# Patient Record
Sex: FEMALE | Race: AMERICAN INDIAN OR ALASKA NATIVE
[De-identification: names, ages, dates, MRNs, and addresses within clinical notes are randomized per-mention and may not be internally consistent; named-entity substitution may affect disease eponyms.]

---

## 2017-06-01 NOTE — ULTRASOUND REPORT
BILATERAL DIGITAL DIAGNOSTIC MAMMOGRAM and BILATERAL BREAST ULTRASOUND: 

06/01/17 15:13:00



CLINICAL: Recalled for bilateral asymmetries.



COMPARISON:05/16/17 screening



FINDINGS: Bilateral spot compression views demonstrate persistent 

bilateral partially circumscribed asymmetries.



Ultrasound of the right breast (including all four quadrants and the 

retroareolar area) was performed and demonstrated no solid mass or 

shadowing.  Numerous benign cysts.  The largest is at 10:30 o'clock 3 

cm from the nipple measures 1.5 x 0.9 x 1.7 cm and correlates with the 

mammographic asymmetry.  A cyst at 1 o'clock 3 cm from the nipple 

measures 4 x 8 mm.



Ultrasound of the left breast (including all four quadrants and the 

retroareolar area) was performed and demonstrated no solid mass or 

shadowing.  Numerous benign cysts.  A cyst at 9 o'clock 4 cm from 

nipple measures 1.7 x 0.8 x 1.7 cm and correlates with the mammographic 

asymmetry.  This is at 11 o'clock 4 cm from nipple measures 7 x 5 x 7 

mm and a cyst at 12 o'clock 4 cm from the nipple measures 7 x 4 x 4 mm.



IMPRESSION: Bilateral benign cysts and benign calcifications.



BI-RADS CATEGORY:  2 - - Benign



RECOMMENDATION: Routine mammographic screening in one year.



ACR BI-RADS MAMMOGRAPHIC CODES:

0 = Needs additional imaging evaluation; 1 = Negative; 2 = Benign; 3 = 

Probably benign; 4 = Suspicious; 5 = Malignant; 6 = Known biopsy-proven 

malignancy



COMMENT:

      1.   Dense breast tissue, i.e., adenosis, fibrocystic 

            changes, etc., may obscure an underlying neoplasm.

      2.   Approximately 10% of cancers are not detected with

            mammography.

      3.   A negative mammography report should not delay biopsy 

            if a clinically suspicious mass is present.





COMMENT:

Patient follow-up letters are generated via our [employer] 

application.

## 2017-06-01 NOTE — MAMMOGRAPHY REPORT
BILATERAL DIGITAL DIAGNOSTIC MAMMOGRAM and BILATERAL BREAST ULTRASOUND: 

06/01/17 15:13:00



CLINICAL: Recalled for bilateral asymmetries.



COMPARISON:05/16/17 screening



FINDINGS: Bilateral spot compression views demonstrate persistent 

bilateral partially circumscribed asymmetries.



Ultrasound of the right breast (including all four quadrants and the 

retroareolar area) was performed and demonstrated no solid mass or 

shadowing.  Numerous benign cysts.  The largest is at 10:30 o'clock 3 

cm from the nipple measures 1.5 x 0.9 x 1.7 cm and correlates with the 

mammographic asymmetry.  A cyst at 1 o'clock 3 cm from the nipple 

measures 4 x 8 mm.



Ultrasound of the left breast (including all four quadrants and the 

retroareolar area) was performed and demonstrated no solid mass or 

shadowing.  Numerous benign cysts.  A cyst at 9 o'clock 4 cm from 

nipple measures 1.7 x 0.8 x 1.7 cm and correlates with the mammographic 

asymmetry.  This is at 11 o'clock 4 cm from nipple measures 7 x 5 x 7 

mm and a cyst at 12 o'clock 4 cm from the nipple measures 7 x 4 x 4 mm.



IMPRESSION: Bilateral benign cysts and benign calcifications.



BI-RADS CATEGORY:  2 - - Benign



RECOMMENDATION: Routine mammographic screening in one year.



ACR BI-RADS MAMMOGRAPHIC CODES:

0 = Needs additional imaging evaluation; 1 = Negative; 2 = Benign; 3 = 

Probably benign; 4 = Suspicious; 5 = Malignant; 6 = Known biopsy-proven 

malignancy



COMMENT:

      1.   Dense breast tissue, i.e., adenosis, fibrocystic 

            changes, etc., may obscure an underlying neoplasm.

      2.   Approximately 10% of cancers are not detected with

            mammography.

      3.   A negative mammography report should not delay biopsy 

            if a clinically suspicious mass is present.





COMMENT:

Patient follow-up letters are generated via our [employer] 

application.

## 2019-07-11 ENCOUNTER — HOSPITAL ENCOUNTER (OUTPATIENT)
Dept: HOSPITAL 5 - SPVWC | Age: 64
Discharge: HOME | End: 2019-07-11
Attending: OBSTETRICS & GYNECOLOGY
Payer: COMMERCIAL

## 2019-07-11 DIAGNOSIS — Z12.31: Primary | ICD-10-CM

## 2019-07-11 PROCEDURE — 77063 BREAST TOMOSYNTHESIS BI: CPT

## 2019-07-11 PROCEDURE — 77067 SCR MAMMO BI INCL CAD: CPT

## 2019-07-15 NOTE — MAMMOGRAPHY REPORT
BILATERAL SCREENING MAMMOGRAM  DIGITAL WITH CAD

3D TOMOSYNTHESIS



INDICATION: Routine screening. Known bilateral cysts.



COMPARISONS: 6/1/2017, 5/16/2017 and 5/12/2016 mammograms and 6/1/2017 bilateral breast ultrasound. M
ammograms



FINDINGS: 2D and 3D craniocaudal and mediolateral oblique views of both breasts were obtained utilizi
[employer] digital acquisition. In addition to standard review, the examination was analyzed for possible abn
ormalities using a computer-assisted detection device (R2 Image ).



The breasts are heterogeneously dense, which may obscure small masses.  Bilateral scattered benign ca
lcifications and bilateral circumscribed densities are not significantly changed compared to previous
 exams.  No suspicious findings are noted in either breast.







IMPRESSION:



NO EVIDENCE OF MALIGNANCY IN EITHER BREAST.  SCREENING MAMMOGRAPHY IN ONE YEAR IS RECOMMENDED.



BI-RADS CATEGORY 2: BENIGN



COMMENT: Patient follow-up letters are generated by our [employer] application.



Signer Name: Jose Mohamud MD 

Signed: 7/15/2019 11:15 AM

 Workstation Name: [id number]

## 2019-07-18 NOTE — MAMMOGRAPHY REPORT
BILATERAL SCREENING MAMMOGRAM  DIGITAL WITH CAD

3D TOMOSYNTHESIS



INDICATION: Routine screening. Known bilateral cysts.



COMPARISONS: 6/1/2017, 5/16/2017 and 5/12/2016 mammograms and 6/1/2017 bilateral breast ultrasound. M
ammograms



FINDINGS: 2D and 3D craniocaudal and mediolateral oblique views of both breasts were obtained utilizi
[employer] digital acquisition. In addition to standard review, the examination was analyzed for possible abn
ormalities using a computer-assisted detection device (R2 Image ).



The breasts are heterogeneously dense, which may obscure small masses.  Bilateral scattered benign ca
lcifications and bilateral circumscribed densities are not significantly changed compared to previous
 exams.  No suspicious findings are noted in either breast.







IMPRESSION:



NO EVIDENCE OF MALIGNANCY IN EITHER BREAST.  SCREENING MAMMOGRAPHY IN ONE YEAR IS RECOMMENDED.



BI-RADS CATEGORY 2: BENIGN



COMMENT: Patient follow-up letters are generated by our [employer] application.



Signer Name: Jose Mohamud MD 

Signed: 7/15/2019 11:15 AM

 Workstation Name: [id number]